# Patient Record
Sex: FEMALE | Race: OTHER | HISPANIC OR LATINO | Employment: OTHER | URBAN - NONMETROPOLITAN AREA
[De-identification: names, ages, dates, MRNs, and addresses within clinical notes are randomized per-mention and may not be internally consistent; named-entity substitution may affect disease eponyms.]

---

## 2022-11-06 ENCOUNTER — HOSPITAL ENCOUNTER (EMERGENCY)
Facility: HOSPITAL | Age: 62
Discharge: HOME | End: 2022-11-06
Attending: EMERGENCY MEDICINE

## 2022-11-06 VITALS
HEART RATE: 88 BPM | SYSTOLIC BLOOD PRESSURE: 175 MMHG | OXYGEN SATURATION: 96 % | TEMPERATURE: 97.8 F | DIASTOLIC BLOOD PRESSURE: 91 MMHG | RESPIRATION RATE: 18 BRPM

## 2022-11-06 DIAGNOSIS — R21 RASH AND NONSPECIFIC SKIN ERUPTION: Primary | ICD-10-CM

## 2022-11-06 RX ORDER — PREDNISONE 20 MG/1
40 TABLET ORAL ONCE
Status: COMPLETED | OUTPATIENT
Start: 2022-11-06 | End: 2022-11-06

## 2022-11-06 RX ORDER — PREDNISONE 20 MG/1
40 TABLET ORAL ONCE
Status: DISCONTINUED | OUTPATIENT
Start: 2022-11-06 | End: 2022-11-06

## 2022-11-06 RX ADMIN — PREDNISONE 40 MG: 20 TABLET ORAL at 09:47

## 2022-11-06 NOTE — ED ATTENDING ATTESTATION
Final Diagnosis:  1  Rash and nonspecific skin eruption           ISapna MD, saw and evaluated the patient  All available labs and X-rays were ordered by me or the resident and have been reviewed by myself  I discussed the patient with the resident / non-physician and agree with the resident's / non-physician practitioner's findings and plan as documented in the resident's / non-physician practicitioner's note, except where noted  At this point, I agree with the current assessment done in the ED  I was present during key portions of all procedures performed unless otherwise stated  Chief Complaint   Patient presents with   • Rash     Pt stated she started with a rash about 1 week ago, shoulder and her elbows  This is a 63 y o  female presenting for evaluation of rash  Patient has rash on bilateral upper arms into the antecubital fossa  Mildly erythematous  Nonpainful to palpation  States this started about a week ago  Can not identify any distinct exposures  Denies any fever chills  Does not follow with physician  PMH:   has no past medical history on file  PSH:   has no past surgical history on file  Social:  Social History     Substance and Sexual Activity   Alcohol Use Not Currently     Social History     Tobacco Use   Smoking Status Never Smoker   Smokeless Tobacco Never Used     Social History     Substance and Sexual Activity   Drug Use Never     PE:  Vitals:    11/06/22 0858   BP: (!) 175/91   BP Location: Left arm   Pulse: 88   Resp: 18   Temp: 97 8 °F (36 6 °C)   TempSrc: Temporal   SpO2: 96%       A:  - patient has thick rash over the radial aspect of her bilateral upper arms then down into the antecubital fossa  This most significant on the right in comparison to left  No obvious pain with palpation  No other obvious areas  Unless otherwise specified above:     General: VS reviewed  Appears in NAD     Head: Normocephalic, atraumatic  Eyes: EOM-I     No exudate  ENT: Atraumatic external nose and ears  No malocclusion  No stridor  No drooling  Neck: No JVD  CV: No pallor noted  Lungs:   No tachypnea  No respiratory distress     Abdomen:  Soft, non-tender, non-distended     MSK:   FROM spontaneously  No obvious deformity     Skin: Dry, intact  No obvious rash  Neuro: Awake, alert, GCS15, CN II-XII grossly intact  Speaking in full sentences  Motor grossly intact  Psychiatric/Behavioral: Appropriate mood and affect   Exam: deferred    P:  - discussed with daughter and patient have low suspicion for acute pathology at this time  Will attempt a steroid cream   Patient does not have a primary care physician  I discussed that I believe that would be beneficial for her to start seeing 1 as she is starting to get the aid for screening exams abuse full as well as possible follow-up for other causes of her rash  Return precautions reviewed  - 13 point ROS was performed and all are normal unless stated in the history above  - Nursing note reviewed  Vitals reviewed  - Orders placed by myself and/or advanced practitioner / resident     - Previous chart was reviewed  - No language barrier    - History obtained from patient  - There are no limitations to the history obtained         Code Status: No Order  Advance Directive and Living Will:      Power of :    POLST:      Medications   predniSONE tablet 40 mg (40 mg Oral Given 11/6/22 0947)     No orders to display     Orders Placed This Encounter   Procedures   • Ambulatory Referral to [facility] Reviewed - No data to display  Time reflects when diagnosis was documented in both MDM as applicable and the Disposition within this note     Time User Action Codes Description Comment    11/6/2022  9:32 AM Tam Roberts Add [R21] Rash and nonspecific skin eruption       ED Disposition     ED Disposition   Discharge    Condition   Stable    Date/Time   Sun Nov 6, 2022  9:32 AM    Comment Danielle 41 discharge to home/self care  Follow-up Information     Follow up With Specialties Details Why Contact Info Additional Information    [facility] Primary [facility] Family Medicine Call on 11/7/2022 To schedule an appointment for further evaluation and treatment of rash [address] [facility], [facility] [facility], [place], [address], [phone]        Discharge Medication List as of 11/6/2022  9:36 AM      START taking these medications    Details   hydrocortisone 2 5 % cream Apply topically 2 (two) times a day, Starting Sun 11/6/2022, Normal             None       Portions of the record may have been created with voice recognition software  Occasional wrong word or "sound a like" substitutions may have occurred due to the inherent limitations of voice recognition software  Read the chart carefully and recognize, using context, where substitutions have occurred      Electronically signed by:  Manuelito Chand

## 2022-11-06 NOTE — DISCHARGE INSTRUCTIONS
- apply hydrocortisone cream to your arms twice daily  - you should receive a phone call to schedule an appointment for further evaluation and treatment of your rash, and other chronic health conditions  - if you do not receive a phone call within the next couple of days call [facility] primary care, number listed below  - return to the emergency department if you begin to develop of blisters, severe pain, or fevers

## 2022-11-06 NOTE — ED PROVIDER NOTES
History  Chief Complaint   Patient presents with   • Rash     Pt stated she started with a rash about 1 week ago, shoulder and her elbows  Patient is a 24-year-old female with no significant PMH that presents to the emergency department with rash on her arms bilaterally starting just below the shoulder and extending down the upper arm to around the elbow on the anterior side  Patient reports that this rash started roughly 1 week ago and denies any new soaps, detergents, foods, lotions  She does report that since the rash started she has been applying lotions and creams without any improvement of symptoms  She reports that the rash is very dry and burns a little bit when she rubs her arms over it  She denies any other symptoms including nausea, vomiting, chest pain, shortness of breath, abdominal pain, headaches and otherwise feels in her normal state health  She does not take any medications on a regular basis and has not started any new meds recently  She denies any sun exposure and cannot think of anything that may have led to this rash developing  She denies any recent travel or injuries  None       History reviewed  No pertinent past medical history  History reviewed  No pertinent surgical history  History reviewed  No pertinent family history  I have reviewed and agree with the history as documented  E-Cigarette/Vaping     E-Cigarette/Vaping Substances     Social History     Tobacco Use   • Smoking status: Never Smoker   • Smokeless tobacco: Never Used   Substance Use Topics   • Alcohol use: Not Currently   • Drug use: Never        Review of Systems   Constitutional: Negative for chills and fever  HENT: Negative for congestion, ear pain and sore throat  Eyes: Negative for pain and visual disturbance  Respiratory: Negative for cough and shortness of breath  Cardiovascular: Negative for chest pain and palpitations     Gastrointestinal: Negative for abdominal pain, constipation, diarrhea, nausea and vomiting  Genitourinary: Negative for dysuria and hematuria  Musculoskeletal: Negative for arthralgias and back pain  Skin: Positive for rash  Negative for color change  Neurological: Negative for dizziness, seizures, syncope, light-headedness and headaches  All other systems reviewed and are negative  Physical Exam  ED Triage Vitals [11/06/22 0858]   Temperature Pulse Respirations Blood Pressure SpO2   97 8 °F (36 6 °C) 88 18 (!) 175/91 96 %      Temp Source Heart Rate Source Patient Position - Orthostatic VS BP Location FiO2 (%)   Temporal Monitor Lying Left arm --      Pain Score       No Pain             Orthostatic Vital Signs  Vitals:    11/06/22 0858   BP: (!) 175/91   Pulse: 88   Patient Position - Orthostatic VS: Lying       Physical Exam  Vitals and nursing note reviewed  Constitutional:       General: She is not in acute distress  Appearance: Normal appearance  She is well-developed  She is not ill-appearing  HENT:      Head: Normocephalic and atraumatic  Right Ear: External ear normal       Left Ear: External ear normal       Mouth/Throat:      Pharynx: Oropharynx is clear  No oropharyngeal exudate or posterior oropharyngeal erythema  Eyes:      General:         Right eye: No discharge  Left eye: No discharge  Extraocular Movements: Extraocular movements intact  Conjunctiva/sclera: Conjunctivae normal    Cardiovascular:      Rate and Rhythm: Normal rate and regular rhythm  Pulses: Normal pulses  Heart sounds: Normal heart sounds  No murmur heard  Pulmonary:      Effort: Pulmonary effort is normal  No respiratory distress  Breath sounds: Normal breath sounds  No wheezing, rhonchi or rales  Musculoskeletal:         General: No swelling or deformity  Normal range of motion  Cervical back: Normal range of motion and neck supple  No tenderness  Skin:     General: Skin is warm and dry        Capillary Refill: Capillary refill takes less than 2 seconds  Findings: Rash present  Rash is not nodular, pustular, urticarial or vesicular  Neurological:      Mental Status: She is alert  ED Medications  Medications   predniSONE tablet 40 mg (40 mg Oral Given 11/6/22 0947)       Diagnostic Studies  Results Reviewed     None                 No orders to display         Procedures  Procedures      ED Course                                       MDM  Number of Diagnoses or Management Options  Rash and nonspecific skin eruption  Diagnosis management comments: Spoke with patient through virtual translation services  61F with no significant PMH presents the emergency department with rash distributed over her upper arms, extending from just below the shoulder to the antecubital fossa bilaterally  Lacking classical rash red flags:  Toxic appearance  Fever  Hypotension  New medication  Mucosal lesions  Severe pain  Very old or young age  Immunosuppressed    No skin sloughing  No rashes in the mouth  No redness in the eyes  No bullae  No petechiae  No central clearing/ targetoid lesions to suggest erythema multiforme  No mucosal involvement  No neck stiffness  No hemorrhagic lesions  No lesions with central necrosis  No current fevers  No desquamation of the skin to suggest staph scalded skin syndrome  No blistering  No strawberry tongue  No recent tick bites to suggest [facility] spotted fever  No headache or other systemic autoimmune symptoms to suggest developing vasculitis  No crepitus  Doubt DRESS - no facial edema, fever, systemic symptoms or tense bullae  Doubt SJS/TEN - no bullae, no atypical target lesions, no mucocutaneous erosions   Doubt AGEN Acute generalized exanthematous pustulosis - does not have the typical pustular appearance of AGEN nor is there positive nikolsky sign, no fever, no facial edema, no bullae, no mucosal involvement    Doubt erythroderma -  no diffuse erythematous plaques or edema, no diffuse exfoliation    In the absence of concerning findings on physical exam patient is administered 1 dose of prednisone by mouth and prescribed hydrocortisone cream to be applied to the arms bilaterally twice daily  An ambulatory referral to [facility] is placed and she is provided with the phone number of a family medicine clinic near her for re-evaluation and treatment of rash and other chronic health conditions  Return precautions are discussed with the patient and they demonstrate understanding of the plan  The patient's questions are all answered to the their satisfaction and the patient is discharged home  Disposition  Final diagnoses:   Rash and nonspecific skin eruption     Time reflects when diagnosis was documented in both MDM as applicable and the Disposition within this note     Time User Action Codes Description Comment    11/6/2022  9:32 AM Edwina Marks Add [R21] Rash and nonspecific skin eruption       ED Disposition     ED Disposition   Discharge    Condition   Stable    Date/Time   Sun Nov 6, 2022  9:32 AM    Comment   Jose Miguel Esparza discharge to home/self care  Follow-up Information     Follow up With Specialties Details Why Contact Info Additional Information    [facility] Primary Care Family Medicine Call on 11/7/2022 To schedule an appointment for further evaluation and treatment of rash [address], [address], [phone]          Patient's Medications   Discharge Prescriptions    HYDROCORTISONE 2 5 % CREAM    Apply topically 2 (two) times a day       Start Date: 11/6/2022 End Date: --       Order Dose: --       Quantity: 28 g    Refills: 0         PDMP Review     None           ED Provider  Attending physically available and evaluated Jose Miguel Esparza I managed the patient along with the ED Attending      Electronically Signed by         Steffany Gaxiola DO  11/06/22 6558